# Patient Record
Sex: MALE | Race: WHITE | URBAN - METROPOLITAN AREA
[De-identification: names, ages, dates, MRNs, and addresses within clinical notes are randomized per-mention and may not be internally consistent; named-entity substitution may affect disease eponyms.]

---

## 2017-06-02 ENCOUNTER — OFFICE VISIT (OUTPATIENT)
Dept: INTERNAL MEDICINE CLINIC | Age: 22
End: 2017-06-02

## 2017-06-02 VITALS
OXYGEN SATURATION: 98 % | BODY MASS INDEX: 25.74 KG/M2 | HEIGHT: 70 IN | HEART RATE: 83 BPM | WEIGHT: 179.8 LBS | DIASTOLIC BLOOD PRESSURE: 78 MMHG | SYSTOLIC BLOOD PRESSURE: 104 MMHG

## 2017-06-02 DIAGNOSIS — J32.4 CHRONIC PANSINUSITIS: ICD-10-CM

## 2017-06-02 DIAGNOSIS — J30.2 SEASONAL ALLERGIC RHINITIS, UNSPECIFIED ALLERGIC RHINITIS TRIGGER: Primary | ICD-10-CM

## 2017-06-02 DIAGNOSIS — L70.9 ACNE, UNSPECIFIED ACNE TYPE: ICD-10-CM

## 2017-06-02 DIAGNOSIS — D23.30 BENIGN NEOPLASM OF SKIN OF FACE: ICD-10-CM

## 2017-06-02 PROCEDURE — 99214 OFFICE O/P EST MOD 30 MIN: CPT | Performed by: INTERNAL MEDICINE

## 2017-06-02 RX ORDER — FLUTICASONE PROPIONATE 50 MCG
1 SPRAY, SUSPENSION (ML) NASAL DAILY
Qty: 1 BOTTLE | Refills: 3 | Status: SHIPPED | OUTPATIENT
Start: 2017-06-02

## 2017-06-02 RX ORDER — MONTELUKAST SODIUM 10 MG/1
10 TABLET ORAL NIGHTLY
Qty: 30 TABLET | Refills: 11 | Status: SHIPPED | OUTPATIENT
Start: 2017-06-02

## 2017-06-02 RX ORDER — FEXOFENADINE HCL 180 MG/1
180 TABLET ORAL DAILY
Qty: 30 TABLET | Refills: 11 | Status: SHIPPED | OUTPATIENT
Start: 2017-06-02

## 2017-06-02 ASSESSMENT — ENCOUNTER SYMPTOMS
GASTROINTESTINAL NEGATIVE: 1
EYES NEGATIVE: 1
RESPIRATORY NEGATIVE: 1

## 2017-06-12 ENCOUNTER — OFFICE VISIT (OUTPATIENT)
Dept: INTERNAL MEDICINE CLINIC | Age: 22
End: 2017-06-12

## 2017-06-12 VITALS
DIASTOLIC BLOOD PRESSURE: 60 MMHG | BODY MASS INDEX: 25.8 KG/M2 | SYSTOLIC BLOOD PRESSURE: 110 MMHG | HEART RATE: 68 BPM | TEMPERATURE: 98.8 F | WEIGHT: 179.8 LBS | RESPIRATION RATE: 16 BRPM

## 2017-06-12 DIAGNOSIS — J02.9 PHARYNGITIS, UNSPECIFIED ETIOLOGY: Primary | ICD-10-CM

## 2017-06-12 DIAGNOSIS — J32.4 CHRONIC PANSINUSITIS: ICD-10-CM

## 2017-06-12 PROCEDURE — 99213 OFFICE O/P EST LOW 20 MIN: CPT | Performed by: INTERNAL MEDICINE

## 2017-06-12 RX ORDER — AMOXICILLIN AND CLAVULANATE POTASSIUM 875; 125 MG/1; MG/1
1 TABLET, FILM COATED ORAL 2 TIMES DAILY
Qty: 20 TABLET | Refills: 0 | Status: SHIPPED | OUTPATIENT
Start: 2017-06-12 | End: 2017-06-22

## 2017-06-12 ASSESSMENT — ENCOUNTER SYMPTOMS
RESPIRATORY NEGATIVE: 1
EYES NEGATIVE: 1
GASTROINTESTINAL NEGATIVE: 1
SORE THROAT: 1

## 2017-06-14 LAB
ORGANISM: ABNORMAL
THROAT CULTURE: ABNORMAL
THROAT CULTURE: ABNORMAL

## 2021-11-15 ENCOUNTER — OFFICE VISIT (OUTPATIENT)
Dept: INTERNAL MEDICINE CLINIC | Age: 26
End: 2021-11-15
Payer: COMMERCIAL

## 2021-11-15 VITALS
SYSTOLIC BLOOD PRESSURE: 125 MMHG | OXYGEN SATURATION: 98 % | HEART RATE: 88 BPM | DIASTOLIC BLOOD PRESSURE: 89 MMHG | BODY MASS INDEX: 28.49 KG/M2 | HEIGHT: 70 IN | WEIGHT: 199 LBS

## 2021-11-15 DIAGNOSIS — L81.9 PIGMENTED SKIN LESION SUSPICIOUS FOR MALIGNANT NEOPLASM: ICD-10-CM

## 2021-11-15 DIAGNOSIS — Z76.89 ESTABLISHING CARE WITH NEW DOCTOR, ENCOUNTER FOR: ICD-10-CM

## 2021-11-15 DIAGNOSIS — K62.89 MASS OF PERIRECTAL SOFT TISSUE: ICD-10-CM

## 2021-11-15 DIAGNOSIS — Z00.00 WELL ADULT EXAM: Primary | ICD-10-CM

## 2021-11-15 PROCEDURE — 99385 PREV VISIT NEW AGE 18-39: CPT | Performed by: INTERNAL MEDICINE

## 2021-11-15 PROCEDURE — 99202 OFFICE O/P NEW SF 15 MIN: CPT | Performed by: INTERNAL MEDICINE

## 2021-11-15 ASSESSMENT — PATIENT HEALTH QUESTIONNAIRE - PHQ9
1. LITTLE INTEREST OR PLEASURE IN DOING THINGS: 0
SUM OF ALL RESPONSES TO PHQ QUESTIONS 1-9: 0
SUM OF ALL RESPONSES TO PHQ QUESTIONS 1-9: 0
SUM OF ALL RESPONSES TO PHQ9 QUESTIONS 1 & 2: 0
2. FEELING DOWN, DEPRESSED OR HOPELESS: 0
SUM OF ALL RESPONSES TO PHQ QUESTIONS 1-9: 0

## 2021-11-15 NOTE — PATIENT INSTRUCTIONS
Patient Education        Well Visit, Ages 25 to 48: Care Instructions  Overview     Well visits can help you stay healthy. Your doctor has checked your overall health and may have suggested ways to take good care of yourself. Your doctor also may have recommended tests. At home, you can help prevent illness with healthy eating, regular exercise, and other steps. Follow-up care is a key part of your treatment and safety. Be sure to make and go to all appointments, and call your doctor if you are having problems. It's also a good idea to know your test results and keep a list of the medicines you take. How can you care for yourself at home? · Get screening tests that you and your doctor decide on. Screening helps find diseases before any symptoms appear. · Eat healthy foods. Choose fruits, vegetables, whole grains, protein, and low-fat dairy foods. Limit fat, especially saturated fat. Reduce salt in your diet. · Limit alcohol. If you are a man, have no more than 2 drinks a day or 14 drinks a week. If you are a woman, have no more than 1 drink a day or 7 drinks a week. · Get at least 30 minutes of physical activity on most days of the week. Walking is a good choice. You also may want to do other activities, such as running, swimming, cycling, or playing tennis or team sports. Discuss any changes in your exercise program with your doctor. · Reach and stay at a healthy weight. This will lower your risk for many problems, such as obesity, diabetes, heart disease, and high blood pressure. · Do not smoke or allow others to smoke around you. If you need help quitting, talk to your doctor about stop-smoking programs and medicines. These can increase your chances of quitting for good. · Care for your mental health. It is easy to get weighed down by worry and stress. Learn strategies to manage stress, like deep breathing and mindfulness, and stay connected with your family and community.  If you find you often feel sad or hopeless, talk with your doctor. Treatment can help. · Talk to your doctor about whether you have any risk factors for sexually transmitted infections (STIs). You can help prevent STIs if you wait to have sex with a new partner (or partners) until you've each been tested for STIs. It also helps if you use condoms (male or female condoms) and if you limit your sex partners to one person who only has sex with you. Vaccines are available for some STIs, such as HPV. · Use birth control if it's important to you to prevent pregnancy. Talk with your doctor about the choices available and what might be best for you. · If you think you may have a problem with alcohol or drug use, talk to your doctor. This includes prescription medicines (such as amphetamines and opioids) and illegal drugs (such as cocaine and methamphetamine). Your doctor can help you figure out what type of treatment is best for you. · Protect your skin from too much sun. When you're outdoors from 10 a.m. to 4 p.m., stay in the shade or cover up with clothing and a hat with a wide brim. Wear sunglasses that block UV rays. Even when it's cloudy, put broad-spectrum sunscreen (SPF 30 or higher) on any exposed skin. · See a dentist one or two times a year for checkups and to have your teeth cleaned. · Wear a seat belt in the car. When should you call for help? Watch closely for changes in your health, and be sure to contact your doctor if you have any problems or symptoms that concern you. Where can you learn more? Go to [email or web address] and sign in to your [employer] account. Enter P072 in the emere box to learn more about \"Well Visit, Ages 25 to 48: Care Instructions. \"     If you do not have an account, please click on the \"Sign Up Now\" link. Current as of: February 11, 2021               Content Version: 13.0  © 6730-2478 Healthwise, Incorporated.    Care instructions adapted under license by [facility]. If you have questions about a medical condition or this instruction, always ask your healthcare professional. [place] [address] any warranty or liability for your use of this information.

## 2021-11-15 NOTE — PROGRESS NOTES
11/15/2021    Ade Caraballo (:  1995) is a 23 y.o. male, here for a preventive medicine evaluation and establish new PCP (formerly patient of Dr Guille Walter but hasn't seen a doctor in years). He has a concern that actually provoked the visit today, which is a warty growth that has drained from time to time along his buttock crease. He is not sure exactly how long its been there but possibly close to a year. Patient Active Problem List   Diagnosis   (none) - all problems resolved or deleted       Review of Systems    Prior to Visit Medications    Medication Sig Taking? Authorizing Provider   fluticasone (FLONASE) 50 MCG/ACT nasal spray 1 spray by Nasal route daily During allergy season and 2 days prior to outdoor activities Yes Gerhardt Coles, MD   montelukast (SINGULAIR) 10 MG tablet Take 1 tablet by mouth nightly During allergy season and 2 days prior to outdoor activities Yes Gerhardt Coles, MD   fexofenadine (ALLEGRA) 180 MG tablet Take 1 tablet by mouth daily Yes Gerhardt Coles, MD   predniSONE (DELTASONE) 10 MG tablet  Yes Historical Provider, MD   fluticasone (FLONASE) 50 MCG/ACT nasal spray  Yes Historical Provider, MD        No Known Allergies    Past Medical History:   Diagnosis Date    Asthma attack     1 time, never been on inhaler since       Past Surgical History:   Procedure Laterality Date    FOOT SURGERY Right     cyst in rt ankle    NOSE SURGERY Right     Sinus       Social History     Socioeconomic History    Marital status: Single     Spouse name: Not on file    Number of children: Not on file    Years of education: Not on file    Highest education level: Not on file   Occupational History    Occupation: Education at work   Tobacco Use    Smoking status: Never Smoker    Smokeless tobacco: Never Used   Substance and Sexual Activity    Alcohol use:  Yes     Alcohol/week: 0.0 standard drinks     Comment: Ocasionally    Drug use: Not on file    Sexual activity: Not on file   Other Topics Concern    Not on file   Social History Narrative    Not on file     Social Determinants of Health     Financial Resource Strain:     Difficulty of Paying Living Expenses: Not on file   Food Insecurity:     Worried About Running Out of Food in the Last Year: Not on file    Vick of Food in the Last Year: Not on file   Transportation Needs:     Lack of Transportation (Medical): Not on file    Lack of Transportation (Non-Medical): Not on file   Physical Activity:     Days of Exercise per Week: Not on file    Minutes of Exercise per Session: Not on file   Stress:     Feeling of Stress : Not on file   Social Connections:     Frequency of Communication with Friends and Family: Not on file    Frequency of Social Gatherings with Friends and Family: Not on file    Attends Quaker Services: Not on file    Active Member of [address] [place] [address] tic or Organizations: Not on file    Attends Club or Organization Meetings: Not on file    Marital Status: Not on file   Intimate Partner Violence:     Fear of Current or Ex-Partner: Not on file    Emotionally Abused: Not on file    Physically Abused: Not on file    Sexually Abused: Not on file   Housing Stability:     Unable to Pay for Housing in the Last Year: Not on file    Number of [place] in the Last Year: Not on file    Unstable Housing in the Last Year: Not on file        Family History   Problem Relation Age of Onset    Cancer Maternal Grandfather     Alzheimer's Disease Paternal Grandfather     Breast Cancer Paternal Aunt        ADVANCE DIRECTIVE: N, <no information>    Vitals:    11/15/21 1318   BP: 125/89   Site: Right Upper Arm   Pulse: 88   SpO2: 98%   Weight: 199 lb (90.3 kg)   Height: 5' 10\" (1.778 m)     Estimated body mass index is 28.55 kg/m² as calculated from the following:    Height as of this encounter: 5' 10\" (1.778 m). Weight as of this encounter: 199 lb (90.3 kg).     Physical Exam  Constitutional: flowsheet data found. Lab Results   Component Value Date    CHOL 154 12/11/2015    TRIG 96 12/11/2015    HDL 46 12/11/2015    LDLCALC 89 12/11/2015    GLUCOSE 85 12/11/2015       The ASCVD Risk score (Tyler Councilman., et al., 2013) failed to calculate for the following reasons: The 2013 ASCVD risk score is only valid for ages 36 to 78    Immunization History   Administered Date(s) Administered    COVID-19, Iverson Peter, PF, 30mcg/0.3mL 05/04/2021, 05/25/2021    DTP 1996, 1996, 1996    DTaP 11/26/1997, 05/31/2000    DTaP vaccine 11/26/1997, 05/31/2000    Hepatitis A 08/22/2006, 02/27/2007    Hepatitis A Ped/Adol (Havrix, Vaqta) 08/22/2006, 02/27/2007    Hepatitis B 1996, 1996, 03/07/1997    Hepatitis B (Recombivax HB) 1996, 1996, 03/07/1997    Hib, unspecified 1996, 1996, 1996, 02/16/1999    Influenza Virus Vaccine 12/11/2015    MMR 06/05/1997, 05/17/2001    Meningococcal MCV4P (Menactra) 11/21/2007, 08/02/2012    Meningococcal MPSV4 (Menomune) 11/21/2007    Meningococcal, MCV4, Unspecifd Conjugate (A,C,Y and W-135) 11/21/2007, 08/02/2012    Pneumococcal Conjugate 13-valent (Paxinos Meres) 05/31/2000    Polio IPV (IPOL) 05/31/2000    Polio OPV 1996, 1996, 11/26/1997    Td, unspecified formulation 08/26/2014    Tdap (Boostrix, Adacel) 11/21/2007    Varicella (Varivax) 02/16/1999, 11/21/2007       Health Maintenance   Topic Date Due    Hepatitis C screen  Never done    HPV vaccine (1 - Male 2-dose series) Never done    HIV screen  Never done    Flu vaccine (1) 09/01/2021    DTaP/Tdap/Td vaccine (4 - Td or Tdap) 08/26/2024    Hepatitis A vaccine  Completed    Hepatitis B vaccine  Completed    Hib vaccine  Completed    Varicella vaccine  Completed    Meningococcal (ACWY) vaccine  Completed    Pneumococcal 0-64 years Vaccine  Completed    COVID-19 Vaccine  Completed          ASSESSMENT/PLAN:  1.  Well adult exam--counseled on continued healthy lifestyle and will check screening labs  -     COMPREHENSIVE METABOLIC PANEL; Future  -     LIPID PANEL; Future  -     HEPATITIS C ANTIBODY; Future  -     HIV-1 AND HIV-2 ANTIBODIES; Future  2. Establishing care with new doctor, encounter for  3. Pigmented skin lesion suspicious for malignant neoplasm  -     Link James MD, General Surgery, [facility]  4. Mass of perirectal soft tissue  -     Link James MD, General Surgery, [facility]      Return in about 2 years (around 11/15/2023) for well adult. An electronic signature was used to authenticate this note.     --Mundo Mcintyre MD on 11/15/2021 at 1:57 PM

## 2021-11-17 ENCOUNTER — INITIAL CONSULT (OUTPATIENT)
Dept: SURGERY | Age: 26
End: 2021-11-17
Payer: COMMERCIAL

## 2021-11-17 VITALS — WEIGHT: 199 LBS | BODY MASS INDEX: 28.55 KG/M2

## 2021-11-17 DIAGNOSIS — L05.91 PILONIDAL CYST: Primary | ICD-10-CM

## 2021-11-17 PROCEDURE — 99203 OFFICE O/P NEW LOW 30 MIN: CPT | Performed by: SURGERY

## 2021-11-17 NOTE — PROGRESS NOTES
Subjective:      Patient ID: Sheng Harris is a 23 y.o. male. HPI  Chief Complaint: ***  Patient referred by Dr. Max Hernández for evaluation of ***. Patient reports symptoms of ***. Location of symptoms is ***. Symptoms were first noted ***. Alleviated by ***. Symptoms aggravated by ***. Previous evaluation includes ***. Patient has a history of ***. Will plan following treatment: ***. Past Medical History:   Diagnosis Date    Asthma attack 2007    1 time, never been on inhaler since       Past Surgical History:   Procedure Laterality Date    FOOT SURGERY Right 2010    cyst in rt ankle    NOSE SURGERY Right 2011    Sinus       Current Outpatient Medications   Medication Sig Dispense Refill    fluticasone (FLONASE) 50 MCG/ACT nasal spray 1 spray by Nasal route daily During allergy season and 2 days prior to outdoor activities 1 Bottle 3    montelukast (SINGULAIR) 10 MG tablet Take 1 tablet by mouth nightly During allergy season and 2 days prior to outdoor activities 30 tablet 11    fexofenadine (ALLEGRA) 180 MG tablet Take 1 tablet by mouth daily 30 tablet 11    predniSONE (DELTASONE) 10 MG tablet   0    fluticasone (FLONASE) 50 MCG/ACT nasal spray   12     No current facility-administered medications for this visit. Prior to Admission medications    Medication Sig Start Date End Date Taking?  Authorizing Provider   fluticasone (FLONASE) 50 MCG/ACT nasal spray 1 spray by Nasal route daily During allergy season and 2 days prior to outdoor activities 6/2/17  Yes Lesa Salgado MD   montelukast (SINGULAIR) 10 MG tablet Take 1 tablet by mouth nightly During allergy season and 2 days prior to outdoor activities 6/2/17  Yes Lesa Salgado MD   fexofenadine (ALLEGRA) 180 MG tablet Take 1 tablet by mouth daily 6/2/17  Yes Lesa Salgado MD   predniSONE (DELTASONE) 10 MG tablet  11/18/15  Yes Historical Provider, MD   fluticasone (FLONASE) 50 MCG/ACT nasal spray  11/18/15  Yes Historical Provider, MD         No Known Allergies    Social History     Socioeconomic History    Marital status: Single     Spouse name: Not on file    Number of children: Not on file    Years of education: Not on file    Highest education level: Not on file   Occupational History    Occupation: Education at work   Tobacco Use    Smoking status: Never Smoker    Smokeless tobacco: Never Used   Substance and Sexual Activity    Alcohol use: Yes     Alcohol/week: 0.0 standard drinks     Comment: Ocasionally    Drug use: Not on file    Sexual activity: Not on file   Other Topics Concern    Not on file   Social History Narrative    Not on file     Social Determinants of Health     Financial Resource Strain:     Difficulty of Paying Living Expenses: Not on file   Food Insecurity:     Worried About Running Out of Food in the Last Year: Not on file    Vick of Food in the Last Year: Not on file   Transportation Needs:     Lack of Transportation (Medical): Not on file    Lack of Transportation (Non-Medical):  Not on file   Physical Activity:     Days of Exercise per Week: Not on file    Minutes of Exercise per Session: Not on file   Stress:     Feeling of Stress : Not on file   Social Connections:     Frequency of Communication with Friends and Family: Not on file    Frequency of Social Gatherings with Friends and Family: Not on file    Attends Moravian Services: Not on file    Active Member of [address] [place] [address] [employer] or Organizations: Not on file    Attends Club or Organization Meetings: Not on file    Marital Status: Not on file   Intimate Partner Violence:     Fear of Current or Ex-Partner: Not on file    Emotionally Abused: Not on file    Physically Abused: Not on file    Sexually Abused: Not on file   Housing Stability:     Unable to Pay for Housing in the Last Year: Not on file    Number of [place] in the Last Year: Not on file    Unstable Housing in the Last Year: Not on file       Family History   Problem Relation Age of Onset    Cancer Maternal Grandfather     Alzheimer's Disease Paternal Grandfather     Breast Cancer Paternal Aunt        Review of Systems    Objective:   Physical Exam    Assessment:      ***      Plan:      ***        Gerald Ruggiero MD

## 2021-11-17 NOTE — PROGRESS NOTES
Subjective:      Patient ID: Ade Caraballo is a 23 y.o. male. HPI  Chief Complaint: skin lesion  Patient referred by Dr. Jairo Vasquez for evaluation of skin lesion. Patient reports symptoms of drainage, discomfort. Location of symptoms is right superior gluteal cleft. Symptoms were first noted in May. He reports drainage once otherwise no change. trialed hydrocortisone without relief. Alleviated by nothing. Symptoms aggravated by prolonged sitting. Previous evaluation includes exam by PCP. Patient has no significant medical history. Will plan following treatment: observation for now, plan to excise if becomes chronic problem. Past Medical History:   Diagnosis Date    Asthma attack 2007    1 time, never been on inhaler since       Past Surgical History:   Procedure Laterality Date    FOOT SURGERY Right 2010    cyst in rt ankle    NOSE SURGERY Right 2011    Sinus       Current Outpatient Medications   Medication Sig Dispense Refill    fluticasone (FLONASE) 50 MCG/ACT nasal spray 1 spray by Nasal route daily During allergy season and 2 days prior to outdoor activities 1 Bottle 3    montelukast (SINGULAIR) 10 MG tablet Take 1 tablet by mouth nightly During allergy season and 2 days prior to outdoor activities 30 tablet 11    fexofenadine (ALLEGRA) 180 MG tablet Take 1 tablet by mouth daily 30 tablet 11    predniSONE (DELTASONE) 10 MG tablet   0    fluticasone (FLONASE) 50 MCG/ACT nasal spray   12     No current facility-administered medications for this visit. Prior to Admission medications    Medication Sig Start Date End Date Taking?  Authorizing Provider   fluticasone (FLONASE) 50 MCG/ACT nasal spray 1 spray by Nasal route daily During allergy season and 2 days prior to outdoor activities 6/2/17  Yes Gerhardt Coles, MD   montelukast (SINGULAIR) 10 MG tablet Take 1 tablet by mouth nightly During allergy season and 2 days prior to outdoor activities 6/2/17  Yes Gerhardt Coles, MD fexofenadine (ALLEGRA) 180 MG tablet Take 1 tablet by mouth daily 6/2/17  Yes Leila Holland MD   predniSONE (DELTASONE) 10 MG tablet  11/18/15  Yes Historical Provider, MD   fluticasone Cori Mika) 50 MCG/ACT nasal spray  11/18/15  Yes Historical Provider, MD         No Known Allergies    Social History     Socioeconomic History    Marital status: Single     Spouse name: Not on file    Number of children: Not on file    Years of education: Not on file    Highest education level: Not on file   Occupational History    Occupation: Education at work   Tobacco Use    Smoking status: Never Smoker    Smokeless tobacco: Never Used   Substance and Sexual Activity    Alcohol use: Yes     Alcohol/week: 0.0 standard drinks     Comment: Ocasionally    Drug use: Not on file    Sexual activity: Not on file   Other Topics Concern    Not on file   Social History Narrative    Not on file     Social Determinants of Health     Financial Resource Strain:     Difficulty of Paying Living Expenses: Not on file   Food Insecurity:     Worried About Running Out of Food in the Last Year: Not on file    Vick of Food in the Last Year: Not on file   Transportation Needs:     Lack of Transportation (Medical): Not on file    Lack of Transportation (Non-Medical):  Not on file   Physical Activity:     Days of Exercise per Week: Not on file    Minutes of Exercise per Session: Not on file   Stress:     Feeling of Stress : Not on file   Social Connections:     Frequency of Communication with Friends and Family: Not on file    Frequency of Social Gatherings with Friends and Family: Not on file    Attends Zoroastrianism Services: Not on file    Active Member of Clubs or Organizations: Not on file    Attends Club or Organization Meetings: Not on file    Marital Status: Not on file   Intimate Partner Violence:     Fear of Current or Ex-Partner: Not on file    Emotionally Abused: Not on file    Physically Abused: Not on file  Sexually Abused: Not on file   Housing Stability:     Unable to Pay for Housing in the Last Year: Not on file    Number of Places Lived in the Last Year: Not on file    Unstable Housing in the Last Year: Not on file       Family History   Problem Relation Age of Onset    Cancer Maternal Grandfather     Alzheimer's Disease Paternal Grandfather     Breast Cancer Paternal Aunt        Review of Systems   Constitutional: Negative. Skin: Positive for wound. Hematological: Negative. All other systems reviewed and are negative. Objective:   Physical Exam  Vitals reviewed. Constitutional:       General: He is not in acute distress. Appearance: He is well-developed. He is not diaphoretic. HENT:      Head: Normocephalic and atraumatic. Right Ear: External ear normal.      Left Ear: External ear normal.   Eyes:      Conjunctiva/sclera: Conjunctivae normal.      Pupils: Pupils are equal, round, and reactive to light. Neck:      Trachea: Trachea normal.   Cardiovascular:      Heart sounds: S1 normal and S2 normal.   Pulmonary:      Effort: Pulmonary effort is normal. No respiratory distress. Abdominal:      General: There is no distension. Palpations: Abdomen is soft. Musculoskeletal:         General: Normal range of motion. Cervical back: Normal range of motion and neck supple. Skin:     General: Skin is warm and dry. Findings: No erythema. Comments: 1 cm chronically flamed sinus tract to multiple midline pits   Neurological:      Mental Status: He is alert and oriented to person, place, and time. Psychiatric:         Behavior: Behavior normal. Behavior is cooperative. Thought Content: Thought content normal.         Judgment: Judgment normal.         Assessment:       Diagnosis Orders   1. Pilonidal cyst             Plan:      Chronically inflamed cyst noted on exam  Discussed observation as this is his first occurrence.   Spoke to excision and recovery  The risks, benefits and alternatives to the planned procedure were discussed. Patient expressed an understanding. Discussed increased risks of poor wound healing due to location and recurrence  He will monitor for now.   Call if worsens or desires excision        Ismael Sneed MD

## 2021-12-15 ENCOUNTER — TELEPHONE (OUTPATIENT)
Dept: INTERNAL MEDICINE CLINIC | Age: 26
End: 2021-12-15

## 2021-12-15 NOTE — TELEPHONE ENCOUNTER
I have been unable to reach this patient by phone. A letter is being sent to the last known home address for him to get overdue labs done.

## 2021-12-15 NOTE — LETTER
[facility]  [address]  Phone: [phone]  Fax: [phone]    Siri Ocampo MD        December 15, 2021    [place] [address]      Dear Adilene Dahl:    Marcella Monroe you will find a copy of an outstanding test that was ordered for you by Dr. Angela Bustamante    Please go to the nearest [id number] [facility] to have labs drawn.     Sincerely,        Siri Ocampo MD